# Patient Record
Sex: FEMALE | Race: WHITE | NOT HISPANIC OR LATINO
[De-identification: names, ages, dates, MRNs, and addresses within clinical notes are randomized per-mention and may not be internally consistent; named-entity substitution may affect disease eponyms.]

---

## 2020-05-27 PROBLEM — Z00.00 ENCOUNTER FOR PREVENTIVE HEALTH EXAMINATION: Status: ACTIVE | Noted: 2020-05-27

## 2020-05-28 ENCOUNTER — APPOINTMENT (OUTPATIENT)
Dept: OBGYN | Facility: CLINIC | Age: 30
End: 2020-05-28
Payer: COMMERCIAL

## 2020-05-28 ENCOUNTER — ASOB RESULT (OUTPATIENT)
Age: 30
End: 2020-05-28

## 2020-05-28 VITALS
SYSTOLIC BLOOD PRESSURE: 129 MMHG | BODY MASS INDEX: 41.07 KG/M2 | HEART RATE: 96 BPM | WEIGHT: 271 LBS | HEIGHT: 68 IN | TEMPERATURE: 99.8 F | DIASTOLIC BLOOD PRESSURE: 89 MMHG

## 2020-05-28 LAB
HCG UR QL: POSITIVE
QUALITY CONTROL: YES

## 2020-05-28 PROCEDURE — 76817 TRANSVAGINAL US OBSTETRIC: CPT

## 2020-05-28 PROCEDURE — 81025 URINE PREGNANCY TEST: CPT

## 2020-05-28 PROCEDURE — 99214 OFFICE O/P EST MOD 30 MIN: CPT | Mod: 25

## 2020-05-28 PROCEDURE — 76801 OB US < 14 WKS SINGLE FETUS: CPT

## 2020-05-28 RX ORDER — ASCORBIC ACID, CHOLECALCIFEROL, .ALPHA.-TOCOPHEROL ACETATE, DL-, PYRIDOXINE HYDROCHLORIDE, FOLIC ACID, CYANOCOBALAMIN, BIOTIN, CALCIUM CARBONATE, FERROUS ASPARTO GLYCINATE, IRON, POTASSIUM IODIDE, MAGNESIUM OXIDE, DOCONEXENT AND LOWBUSH BLUEBERRY 60; 1000; 10; 26; 400; 13; 280; 80; 9; 9; 150; 25; 350; 25; 600 MG/1; [IU]/1; [IU]/1; MG/1; UG/1; UG/1; UG/1; MG/1; MG/1; MG/1; UG/1; MG/1; MG/1; MG/1; UG/1
18-0.6-0.4-35 CAPSULE, GELATIN COATED ORAL
Qty: 90 | Refills: 0 | Status: ACTIVE | COMMUNITY
Start: 2020-05-28 | End: 1900-01-01

## 2020-05-28 NOTE — DISCUSSION/SUMMARY
[FreeTextEntry1] : Patient with positive pregnancy test and abnormal bleeding.\par \par Will get blood work and pelvic ultrasound.\par \par Zaian Marcelo M.D.\par

## 2020-05-28 NOTE — CHIEF COMPLAINT
[Follow Up] : follow up GYN visit [FreeTextEntry1] : Patient is here for evaluation bleeding , urine pregnancy test - positive , bleeding since last week  May 15 , LMP  April 15- 2020  patient denies cramping , pain .

## 2020-05-29 LAB
ABO + RH PNL BLD: NORMAL
BASOPHILS # BLD AUTO: 0.05 K/UL
BASOPHILS NFR BLD AUTO: 0.4 %
EOSINOPHIL # BLD AUTO: 0.11 K/UL
EOSINOPHIL NFR BLD AUTO: 0.9 %
ESTRADIOL SERPL-MCNC: 247 PG/ML
HCG SERPL-MCNC: 9688 MIU/ML
HCT VFR BLD CALC: 39.7 %
HGB BLD-MCNC: 12 G/DL
IMM GRANULOCYTES NFR BLD AUTO: 0.3 %
LYMPHOCYTES # BLD AUTO: 2.89 K/UL
LYMPHOCYTES NFR BLD AUTO: 24.7 %
MAN DIFF?: NORMAL
MCHC RBC-ENTMCNC: 24.7 PG
MCHC RBC-ENTMCNC: 30.2 G/DL
MCV RBC AUTO: 81.9 FL
MONOCYTES # BLD AUTO: 0.49 K/UL
MONOCYTES NFR BLD AUTO: 4.2 %
NEUTROPHILS # BLD AUTO: 8.1 K/UL
NEUTROPHILS NFR BLD AUTO: 69.5 %
PLATELET # BLD AUTO: 199 K/UL
PROGEST SERPL-MCNC: 5 NG/ML
RBC # BLD: 4.85 M/UL
RBC # FLD: 16 %
WBC # FLD AUTO: 11.68 K/UL

## 2020-05-29 RX ORDER — PROGESTERONE 200 MG/1
200 CAPSULE ORAL
Qty: 6 | Refills: 1 | Status: ACTIVE | COMMUNITY
Start: 2020-05-29 | End: 1900-01-01

## 2020-06-02 LAB — PROGEST SERPL-MCNC: 3.6 NG/ML

## 2020-06-04 ENCOUNTER — APPOINTMENT (OUTPATIENT)
Dept: OBGYN | Facility: CLINIC | Age: 30
End: 2020-06-04
Payer: COMMERCIAL

## 2020-06-04 ENCOUNTER — APPOINTMENT (OUTPATIENT)
Dept: ANTEPARTUM | Facility: CLINIC | Age: 30
End: 2020-06-04
Payer: COMMERCIAL

## 2020-06-04 ENCOUNTER — ASOB RESULT (OUTPATIENT)
Age: 30
End: 2020-06-04

## 2020-06-04 ENCOUNTER — OUTPATIENT (OUTPATIENT)
Dept: OUTPATIENT SERVICES | Facility: HOSPITAL | Age: 30
LOS: 1 days | Discharge: HOME | End: 2020-06-04

## 2020-06-04 DIAGNOSIS — N93.9 ABNORMAL UTERINE AND VAGINAL BLEEDING, UNSPECIFIED: ICD-10-CM

## 2020-06-04 DIAGNOSIS — Z34.90 ENCOUNTER FOR SUPERVISION OF NORMAL PREGNANCY, UNSPECIFIED, UNSPECIFIED TRIMESTER: ICD-10-CM

## 2020-06-04 PROCEDURE — 76801 OB US < 14 WKS SINGLE FETUS: CPT

## 2020-06-04 PROCEDURE — 76817 TRANSVAGINAL US OBSTETRIC: CPT

## 2020-06-04 PROCEDURE — 76817 TRANSVAGINAL US OBSTETRIC: CPT | Mod: 26,77
